# Patient Record
Sex: FEMALE | Race: WHITE | NOT HISPANIC OR LATINO | Employment: FULL TIME | URBAN - METROPOLITAN AREA
[De-identification: names, ages, dates, MRNs, and addresses within clinical notes are randomized per-mention and may not be internally consistent; named-entity substitution may affect disease eponyms.]

---

## 2019-04-26 ENCOUNTER — NON-PROVIDER VISIT (OUTPATIENT)
Dept: URGENT CARE | Facility: PHYSICIAN GROUP | Age: 49
End: 2019-04-26

## 2019-04-26 DIAGNOSIS — Z11.1 SCREENING FOR TUBERCULOSIS: ICD-10-CM

## 2019-04-26 PROCEDURE — 86580 TB INTRADERMAL TEST: CPT | Performed by: FAMILY MEDICINE

## 2019-04-26 NOTE — PROGRESS NOTES
Peggy Villegas is a 48 y.o. female here for a non-provider visit for PPD placement -- Step 1 of 1    Reason for PPD:  work requirement    1. TB evaluation questionnaire completed by patient? Yes      -  If any answers marked yes did you contact a provider prior to placing? Not Indicated  2.  Patient notified to return to clinic for reading on:   4/28/2019 after 11:30am  4/29/2019 before 11:30am  3.  PPD Placement documentation completed on TB evaluation questionnaire? Yes  4.  Location of TB evaluation questionnaire filed: [facility]

## 2019-04-29 ENCOUNTER — NON-PROVIDER VISIT (OUTPATIENT)
Dept: URGENT CARE | Facility: PHYSICIAN GROUP | Age: 49
End: 2019-04-29

## 2019-04-29 LAB — TB WHEAL 3D P 5 TU DIAM: 0 MM

## 2019-04-29 NOTE — PROGRESS NOTES
Peggy Villegas is a 48 y.o. female here for a non-provider visit for PPD reading -- Step 1 of 1.      1.  Resulted in Epic under enter/edit results? Yes   2.  TB evaluation questionnaire scanned into chart and original given to patient?Yes      3. Was induration greater than 0 mm? No.    Routed to PCP? No

## 2019-10-23 ENCOUNTER — OFFICE VISIT (OUTPATIENT)
Dept: URGENT CARE | Facility: CLINIC | Age: 49
End: 2019-10-23
Payer: COMMERCIAL

## 2019-10-23 VITALS
WEIGHT: 172 LBS | RESPIRATION RATE: 14 BRPM | OXYGEN SATURATION: 97 % | DIASTOLIC BLOOD PRESSURE: 88 MMHG | HEIGHT: 67 IN | BODY MASS INDEX: 27 KG/M2 | SYSTOLIC BLOOD PRESSURE: 152 MMHG | TEMPERATURE: 98.2 F | HEART RATE: 71 BPM

## 2019-10-23 DIAGNOSIS — S05.92XA LEFT EYE INJURY, INITIAL ENCOUNTER: ICD-10-CM

## 2019-10-23 PROCEDURE — 99204 OFFICE O/P NEW MOD 45 MIN: CPT | Performed by: PHYSICIAN ASSISTANT

## 2019-10-23 RX ORDER — ERYTHROMYCIN 5 MG/G
OINTMENT OPHTHALMIC
Qty: 1 TUBE | Refills: 0 | Status: SHIPPED | OUTPATIENT
Start: 2019-10-23

## 2019-10-23 ASSESSMENT — ENCOUNTER SYMPTOMS
BLURRED VISION: 0
COUGH: 0
EYE PAIN: 1
VOMITING: 0
NAUSEA: 0
EYE DISCHARGE: 0
SHORTNESS OF BREATH: 0
DOUBLE VISION: 0
PHOTOPHOBIA: 0
CHILLS: 0
EYE REDNESS: 1
FEVER: 0

## 2019-10-23 NOTE — LETTER
October 23, 2019       Patient: Peggy Villegas   YOB: 1970   Date of Visit: 10/23/2019         To Whom It May Concern:    It is my medical opinion that Peggy Villegas should be excused from work for today.      If you have any questions or concerns, please don't hesitate to call [phone]          Sincerely,          Fazal Haney P.A.-C.  Electronically Signed

## 2019-10-23 NOTE — PROGRESS NOTES
Subjective:     Peggy Villegas is a 49 y.o. female who presents for Eye Injury (Pt was helping her grandaughter put her shoe on and she accidently pocked her LT eye yesturday. Pt is having pain)       Patient comes clinic noting injury to her left eye yesterday.  She states she was helping her 2-year-old granddaughter put on her shoes when she reached up and her granddaughters fingernail impacted the upper central area of patient's left eye.  She complained of immediate pain.  She denies visual changes since then.  She complains of irritation.  She denies any foreign body sensation.  She denies discharge or drainage from eye.  She denies crusting of the eye this morning.  Denies fevers chills cough or sore throat.  Notes mild pain to central upper eye secondary to trauma.    Past Medical History:   Diagnosis Date   • ASTHMA    • Hypoglycemia      Past Surgical History:   Procedure Laterality Date   • BREAST IMPLANT REVISION  4/8/08    Performed by VIRGIL ARAGON at SURGERY [facility] ORS   • BREAST IMPLANT REVISION  4/8/08    Performed by VIRGIL ARAGON at [facility] ORS   • TUBAL LIGATION  11/2007   • APPENDECTOMY  2006   • MAMMOPLASTY AUGMENTATION  2002   • TONSILLECTOMY  1973     Social History     Socioeconomic History   • Marital status:      Spouse name: Not on file   • Number of children: Not on file   • Years of education: Not on file   • Highest education level: Not on file   Occupational History   • Not on file   Social Needs   • Financial resource strain: Not on file   • Food insecurity:     Worry: Not on file     Inability: Not on file   • Transportation needs:     Medical: Not on file     Non-medical: Not on file   Tobacco Use   • Smoking status: Current Every Day Smoker   • Smokeless tobacco: Never Used   • Tobacco comment: pack a day   Substance and Sexual Activity   • Alcohol use: Yes     Comment: 5 beers 12/26/10   • Drug use: Yes     Comment: pot 12/26/10   • Sexual  "activity: Not on file   Lifestyle   • Physical activity:     Days per week: Not on file     Minutes per session: Not on file   • Stress: Not on file   Relationships   • Social connections:     Talks on phone: Not on file     Gets together: Not on file     Attends Yazidi service: Not on file     Active member of club or organization: Not on file     Attends meetings of clubs or organizations: Not on file     Relationship status: Not on file   • Intimate partner violence:     Fear of current or ex partner: Not on file     Emotionally abused: Not on file     Physically abused: Not on file     Forced sexual activity: Not on file   Other Topics Concern   • Not on file   Social History Narrative   • Not on file      Family History   Problem Relation Age of Onset   • Heart Disease Maternal Grandfather    • Stroke Maternal Grandfather    • Diabetes Maternal Grandmother     Review of Systems   Constitutional: Negative for chills and fever.   Eyes: Positive for pain and redness. Negative for blurred vision, double vision, photophobia and discharge.   Respiratory: Negative for cough and shortness of breath.    Gastrointestinal: Negative for nausea and vomiting.   Skin: Negative for rash.   No Known Allergies   Objective:   /88   Pulse 71   Temp 36.8 °C (98.2 °F)   Resp 14   Ht 1.702 m (5' 7\")   Wt 78 kg (172 lb)   SpO2 97%   BMI 26.94 kg/m²   Physical Exam   Constitutional: She is oriented to person, place, and time. She appears well-developed and well-nourished. No distress.   HENT:   Head: Normocephalic and atraumatic.   Right Ear: External ear normal.   Left Ear: External ear normal.   Nose: Nose normal.   Eyes: Pupils are equal, round, and reactive to light. EOM and lids are normal. Lids are everted and swept, no foreign bodies found. Right eye exhibits no discharge. Left eye exhibits no chemosis, no discharge, no exudate and no hordeolum. No foreign body present in the left eye. Right conjunctiva is not " "injected. Right conjunctiva has no hemorrhage. Left conjunctiva is injected ( mildly). Left conjunctiva has no hemorrhage. No scleral icterus.   Left eye irrigated with saline, no foreign body located, fluorescein dye negative, central area to upper eye with increased vascularization without fluorescein uptake   Neck: Neck supple.   Pulmonary/Chest: Effort normal. No respiratory distress.   Musculoskeletal: Normal range of motion.   Neurological: She is alert and oriented to person, place, and time. She is not disoriented.   Skin: Skin is warm and dry. She is not diaphoretic. No erythema. No pallor.   Psychiatric: Her speech is normal and behavior is normal.   Nursing note and vitals reviewed.        Assessment/Plan:   Assessment    1. Left eye injury, initial encounter  - erythromycin 5 MG/GM Ointment; Apply 1/2\" ribbon to lower lid of affected eye 3-4x's/day x 5days  Dispense: 1 Tube; Refill: 0  Supportive care is reviewed with patient/caregiver - recommend to push PO fluids and electrolytes, suspect very mild eye trauma, sent with erythromycin ointment for symptom management and protection from infection despite negative fluorescein uptake-to ER with worsened symptoms     ER precautions with any worsening symptoms are reviewed with patient/caregiver and they do express understanding    Differential diagnosis, natural history, supportive care, and indications for immediate follow-up discussed.    "

## 2019-10-23 NOTE — PROGRESS NOTES
"Subjective:      Peggy Villegas is a 49 y.o. female who presents with Eye Injury (Pt was helping her grandaughter put her shoe on and she accidently pocked her LT eye yesturday. Pt is having pain)            Eye Injury          Review of Systems   Eyes: Positive for pain.          Objective:     /88   Pulse 71   Temp 36.8 °C (98.2 °F)   Resp 14   Ht 1.702 m (5' 7\")   Wt 78 kg (172 lb)   SpO2 97%   BMI 26.94 kg/m²      Physical Exam            Assessment/Plan:     1. Left eye injury, initial encounter  ***    "